# Patient Record
Sex: MALE | Race: BLACK OR AFRICAN AMERICAN | NOT HISPANIC OR LATINO | URBAN - METROPOLITAN AREA
[De-identification: names, ages, dates, MRNs, and addresses within clinical notes are randomized per-mention and may not be internally consistent; named-entity substitution may affect disease eponyms.]

---

## 2019-10-22 ENCOUNTER — EMERGENCY (EMERGENCY)
Facility: HOSPITAL | Age: 29
LOS: 1 days | Discharge: HOME | End: 2019-10-22
Attending: EMERGENCY MEDICINE
Payer: SELF-PAY

## 2019-10-22 VITALS
SYSTOLIC BLOOD PRESSURE: 138 MMHG | RESPIRATION RATE: 16 BRPM | HEART RATE: 64 BPM | OXYGEN SATURATION: 99 % | DIASTOLIC BLOOD PRESSURE: 79 MMHG | HEIGHT: 64 IN | WEIGHT: 175.05 LBS | TEMPERATURE: 98 F

## 2019-10-22 PROCEDURE — 90471 IMMUNIZATION ADMIN: CPT

## 2019-10-22 PROCEDURE — 99283 EMERGENCY DEPT VISIT LOW MDM: CPT

## 2019-10-22 PROCEDURE — 99283 EMERGENCY DEPT VISIT LOW MDM: CPT | Mod: 25

## 2019-10-22 PROCEDURE — 73130 X-RAY EXAM OF HAND: CPT

## 2019-10-22 PROCEDURE — 90715 TDAP VACCINE 7 YRS/> IM: CPT

## 2019-10-22 PROCEDURE — 73130 X-RAY EXAM OF HAND: CPT | Mod: 26,LT

## 2019-10-22 RX ORDER — TETANUS TOXOID, REDUCED DIPHTHERIA TOXOID AND ACELLULAR PERTUSSIS VACCINE, ADSORBED 5; 2.5; 8; 8; 2.5 [IU]/.5ML; [IU]/.5ML; UG/.5ML; UG/.5ML; UG/.5ML
0.5 SUSPENSION INTRAMUSCULAR ONCE
Refills: 0 | Status: COMPLETED | OUTPATIENT
Start: 2019-10-22 | End: 2019-10-22

## 2019-10-22 RX ORDER — IBUPROFEN 200 MG
600 TABLET ORAL ONCE
Refills: 0 | Status: COMPLETED | OUTPATIENT
Start: 2019-10-22 | End: 2019-10-22

## 2019-10-22 RX ORDER — ACETAMINOPHEN 500 MG
975 TABLET ORAL ONCE
Refills: 0 | Status: COMPLETED | OUTPATIENT
Start: 2019-10-22 | End: 2019-10-22

## 2019-10-22 RX ADMIN — Medication 600 MILLIGRAM(S): at 09:55

## 2019-10-22 RX ADMIN — Medication 975 MILLIGRAM(S): at 09:55

## 2019-10-22 RX ADMIN — Medication 975 MILLIGRAM(S): at 11:44

## 2019-10-22 RX ADMIN — TETANUS TOXOID, REDUCED DIPHTHERIA TOXOID AND ACELLULAR PERTUSSIS VACCINE, ADSORBED 0.5 MILLILITER(S): 5; 2.5; 8; 8; 2.5 SUSPENSION INTRAMUSCULAR at 09:56

## 2019-10-22 RX ADMIN — Medication 600 MILLIGRAM(S): at 11:44

## 2019-10-22 NOTE — ED PROVIDER NOTE - PATIENT PORTAL LINK FT
You can access the FollowMyHealth Patient Portal offered by [facility] by registering at the following website: [email or web address]. By joining [employer]’s FollowMyHealth portal, you will also be able to view your health information using other applications (apps) compatible with our system.
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
## 2019-10-22 NOTE — ED PROVIDER NOTE - PROGRESS NOTE DETAILS
lac repaired, xray negative. pt stable for discharge. Advised on PMD f/u and strict return precautions. - Juan Robbins PA-C
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
## 2019-10-22 NOTE — ED ADULT NURSE NOTE - NSIMPLEMENTINTERV_GEN_ALL_ED
Implemented All Universal Safety Interventions:  [place] to call system. Call bell, personal items and telephone within reach. Instruct patient to call for assistance. Room bathroom lighting operational. Non-slip footwear when patient is off stretcher. Physically safe environment: no spills, clutter or unnecessary equipment. Stretcher in lowest position, wheels locked, appropriate side rails in place.

## 2019-10-22 NOTE — ED PROVIDER NOTE - PHYSICAL EXAMINATION
GEN: Very well appearing male, NAD, A&O x3.   HEENT: NCAT, sclera white w/o injection PERRLA, EOMI. Nares patent, turbinates w/o edema or erythema, no polyps or septal deviation. No auricular tenderness, TMs pearly grey. No postauricular tenderness. No otorrhea or rhinorrhea. Mouth and pharynx w/o erythema or exudates, uvula midline, no tonsillar enlargement. Neck supple FROM. No midline or paraspinal tenderness. Trachea midline, no lymphadenopathy, no JVD. No carotid bruits.   RESP: No retractions or chest wall deformities. No chest wall tenderness, Lungs CTA anterior and posterior b/l, no wheezes, rales, or rhonchi.   CARDS: Heart is RRR w/ clear/distinct S1, S2, no S3, S4, murmurs, gallops, or rubs.   GASTRO: Abdomen has no obvious deformities, lesions, or pulsations. No seatbelt sign. NABS all 4 quadrants, no bruits, abdomen soft, non-tender, no organomegaly or masses. No CVAT b/l.   MSK: 2+ carotid, radial, ulnar, dorsalis pedis, and posterior tibial pulses. No edema or tenderness of the lower extremities. Spine without any obvious scoliosis, kyphosis, or lordosis. No midline or paraspinal tenderness diffuse spine. FROM w/o pain of spine. + mild swelling with overlying laceration noted to dorsal aspect of L hand, tendons intact, full AROM hand at wrist and all digits. No scaphoid tenderness. Normal and equal sensation UE, LE and face b/l. 5/5 strength upper and lower extremity b/l.  NEURO: AOx3. CN II-XII intact. Romberg negative, pronator drift negative. Normal gait and gross cerebellar functioning.  SKIN: Warm skin. +3.5 linear laceration noted to dorsal L hand, clean, no obvious foreign body. No erythema or bruising noted. No rashes noted. Cap refill <2 sec.

## 2020-02-17 NOTE — ED ADULT NURSE NOTE - NEURO ASSESSMENT
WDL Glycopyrrolate Counseling:  I discussed with the patient the risks of glycopyrrolate including but not limited to skin rash, drowsiness, dry mouth, difficulty urinating, and blurred vision.
